# Patient Record
Sex: FEMALE | Race: BLACK OR AFRICAN AMERICAN | NOT HISPANIC OR LATINO | URBAN - METROPOLITAN AREA
[De-identification: names, ages, dates, MRNs, and addresses within clinical notes are randomized per-mention and may not be internally consistent; named-entity substitution may affect disease eponyms.]

---

## 2024-08-24 ENCOUNTER — APPOINTMENT (OUTPATIENT)
Dept: RADIOLOGY | Facility: HOSPITAL | Age: 69
End: 2024-08-24
Payer: COMMERCIAL

## 2024-08-24 ENCOUNTER — HOSPITAL ENCOUNTER (EMERGENCY)
Facility: HOSPITAL | Age: 69
Discharge: HOME | End: 2024-08-24
Attending: EMERGENCY MEDICINE
Payer: COMMERCIAL

## 2024-08-24 VITALS
RESPIRATION RATE: 16 BRPM | OXYGEN SATURATION: 98 % | DIASTOLIC BLOOD PRESSURE: 81 MMHG | SYSTOLIC BLOOD PRESSURE: 130 MMHG | HEART RATE: 89 BPM | TEMPERATURE: 97.3 F

## 2024-08-24 DIAGNOSIS — M25.551 RIGHT HIP PAIN: Primary | ICD-10-CM

## 2024-08-24 PROCEDURE — 73502 X-RAY EXAM HIP UNI 2-3 VIEWS: CPT | Mod: RIGHT SIDE | Performed by: RADIOLOGY

## 2024-08-24 PROCEDURE — 2500000001 HC RX 250 WO HCPCS SELF ADMINISTERED DRUGS (ALT 637 FOR MEDICARE OP)

## 2024-08-24 PROCEDURE — 73502 X-RAY EXAM HIP UNI 2-3 VIEWS: CPT | Mod: RT

## 2024-08-24 PROCEDURE — 99284 EMERGENCY DEPT VISIT MOD MDM: CPT | Performed by: EMERGENCY MEDICINE

## 2024-08-24 PROCEDURE — 99283 EMERGENCY DEPT VISIT LOW MDM: CPT

## 2024-08-24 RX ORDER — NAPROXEN 500 MG/1
500 TABLET ORAL ONCE
Status: COMPLETED | OUTPATIENT
Start: 2024-08-24 | End: 2024-08-24

## 2024-08-24 RX ADMIN — NAPROXEN 500 MG: 500 TABLET ORAL at 12:54

## 2024-08-24 ASSESSMENT — PAIN SCALES - GENERAL: PAINLEVEL_OUTOF10: 8

## 2024-08-24 ASSESSMENT — COLUMBIA-SUICIDE SEVERITY RATING SCALE - C-SSRS
1. IN THE PAST MONTH, HAVE YOU WISHED YOU WERE DEAD OR WISHED YOU COULD GO TO SLEEP AND NOT WAKE UP?: NO
6. HAVE YOU EVER DONE ANYTHING, STARTED TO DO ANYTHING, OR PREPARED TO DO ANYTHING TO END YOUR LIFE?: NO
2. HAVE YOU ACTUALLY HAD ANY THOUGHTS OF KILLING YOURSELF?: NO

## 2024-08-24 ASSESSMENT — PAIN - FUNCTIONAL ASSESSMENT: PAIN_FUNCTIONAL_ASSESSMENT: 0-10

## 2024-08-24 NOTE — ED TRIAGE NOTES
Patient comes in today endorsing R hip for the past 2 days; denies any falls or other injury to the site; ambulatory into triage; Endorses only a hx of HTN which she is compliant with medication for

## 2024-08-24 NOTE — ED PROVIDER NOTES
CC: Hip Pain     HPI:  This is a 68-year-old female who presents to the emergency department with left hip and back pain.  States that this pain began about 3 days ago.  Denies any trauma or or injury or inciting event.  States it has been getting progressively worse.  States that is causing her to walk funny.  States that his posterior to her hip.  She denies any midline back pain.  She denies any numbness or tingling.  She denies any radiation of pain down into her leg.  She denies any abnormal sensations in her groin.  She denies any episodes of incontinence.  She has been using tramadol at home which she uses for chronic pain without much benefit.  She endorses weakness due to the pain.  Denies this specific pain happening to her in the past.  No other symptoms at this time.    Limitations to history: None  Independent historian(s): None  Records Reviewed: Recent available ED and inpatient notes reviewed in EMR.    PMHx/PSHx:  Per HPI.   - has no past medical history on file.  - has no past surgical history on file.    Medications:  Reviewed in EMR. See EMR for complete list of medications and doses.    Allergies:  Patient has no known allergies.    Social History:  - Tobacco:  has no history on file for tobacco use.   - Alcohol:  has no history on file for alcohol use.   - Illicit Drugs:  has no history on file for drug use.     ROS:  Per HPI.       ???????????????????????????????????????????????????????????????  Triage Vitals:  T 36.3 °C (97.3 °F)  HR 89  /81  RR 16  O2 98 %      Physical Exam    General: Patient resting comfortably in a chair, no acute distress, overall well appearing, and appropriately conversational.   Head: Normocephalic. Atraumatic.  Neck: FROM. No gross masses.   Eyes: EOMI. Conjunctiva clear.   ENT: Moist mucous membranes, no apparent trauma or lesions.  CV: Regular rate. 2+ radial pulses bilaterally.  Resp: No respiratory distress, breathing easily. Speaks in full sentences.     GI: Soft, non-distended. No tenderness with palpation.   MSK: Point tenderness to palpation in the right posterior sacral/gluteal region.  No midline lumbar spinal tenderness.  No lateral tenderness in the hip joint.  No anterior hip tenderness.  Trendelenburg gait appreciated with right sided hip drop.  EXT: No peripheral edema, contusions, or wounds.   Skin: Warm and dry, no rashes or lesions.  Neuro: Alert. No focal neurological deficits. Motor and sensation intact throughout. Speech fluent. Normal gait.   Psych: Appropriate mood and behavior, converses and responds appropriately.    ???????????????????????????????????????????????????????????????  EKG:  None    MDM:  This is a 68-year-old female who presents to the emergency department for right-sided posterior hip pain.  She is hemodynamically stable and in no distress.  Vital signs are within normal limits.  On physical exam she has point tenderness to the right posterior hip.  She has a Trendelenburg gait upon ambulation.  At this time there is concern for gluteus medius weakness and hip abductor weakness.  No history of trauma or inciting injury.  Low suspicion for occult fracture.  No concern at this time for midline back pain, cauda equina syndrome.  X-rays will be obtained to rule out any injuries.  Patient will be treated with anti-inflammatory medications.  X-rays returned negative for any acute pathology.  Unfortunately patient left the emergency department prior to me counseling them on negative imaging and treatment moving forward.  Patient had capacity and has the ability to leave under her own will.  She will be left without treatment complete.        ED Course:  Diagnoses as of 08/24/24 1538   Right hip pain       Social Determinants Limiting Care:  None identified    Disposition:  [facility]       Salty Sams, DO  Emergency Medicine PGY-3  [facility]      Procedures ? [employer] last updated 8/24/2024 3:38 PM         Salty Sams, DO  Resident  08/24/24 1412

## 2024-10-01 ENCOUNTER — HOSPITAL ENCOUNTER (OUTPATIENT)
Dept: RADIOLOGY | Facility: HOSPITAL | Age: 69
Discharge: HOME | End: 2024-10-01
Payer: MEDICARE

## 2024-10-01 DIAGNOSIS — M15.9 POLYOSTEOARTHRITIS, UNSPECIFIED: ICD-10-CM

## 2024-10-01 DIAGNOSIS — M25.751 OSTEOPHYTE, RIGHT HIP: ICD-10-CM

## 2024-10-01 DIAGNOSIS — M21.371 FOOT DROP, RIGHT FOOT: ICD-10-CM

## 2024-10-11 ENCOUNTER — HOSPITAL ENCOUNTER (OUTPATIENT)
Dept: RADIOLOGY | Facility: HOSPITAL | Age: 69
Discharge: HOME | End: 2024-10-11
Payer: MEDICARE

## 2024-10-11 DIAGNOSIS — M25.751 OSTEOPHYTE, RIGHT HIP: ICD-10-CM

## 2024-10-11 DIAGNOSIS — M21.371 FOOT DROP, RIGHT FOOT: ICD-10-CM

## 2024-10-11 DIAGNOSIS — M15.9 POLYOSTEOARTHRITIS, UNSPECIFIED: ICD-10-CM

## 2024-10-11 PROCEDURE — 72148 MRI LUMBAR SPINE W/O DYE: CPT
